# Patient Record
Sex: MALE | Race: WHITE | NOT HISPANIC OR LATINO
[De-identification: names, ages, dates, MRNs, and addresses within clinical notes are randomized per-mention and may not be internally consistent; named-entity substitution may affect disease eponyms.]

---

## 2021-12-17 ENCOUNTER — APPOINTMENT (OUTPATIENT)
Dept: UROLOGY | Facility: CLINIC | Age: 71
End: 2021-12-17
Payer: MEDICARE

## 2021-12-17 ENCOUNTER — OUTPATIENT (OUTPATIENT)
Dept: OUTPATIENT SERVICES | Facility: HOSPITAL | Age: 71
LOS: 1 days | End: 2021-12-17
Payer: MEDICARE

## 2021-12-17 ENCOUNTER — APPOINTMENT (OUTPATIENT)
Age: 71
End: 2021-12-17
Payer: MEDICARE

## 2021-12-17 VITALS
TEMPERATURE: 97.9 F | HEIGHT: 64 IN | SYSTOLIC BLOOD PRESSURE: 130 MMHG | HEART RATE: 64 BPM | RESPIRATION RATE: 17 BRPM | WEIGHT: 170 LBS | DIASTOLIC BLOOD PRESSURE: 77 MMHG | BODY MASS INDEX: 29.02 KG/M2

## 2021-12-17 DIAGNOSIS — Z00.8 ENCOUNTER FOR OTHER GENERAL EXAMINATION: ICD-10-CM

## 2021-12-17 DIAGNOSIS — R31.29 OTHER MICROSCOPIC HEMATURIA: ICD-10-CM

## 2021-12-17 PROCEDURE — 74178 CT ABD&PLV WO CNTR FLWD CNTR: CPT | Mod: ME

## 2021-12-17 PROCEDURE — G1004: CPT

## 2021-12-17 PROCEDURE — 74178 CT ABD&PLV WO CNTR FLWD CNTR: CPT | Mod: 26,ME

## 2021-12-17 PROCEDURE — 82565 ASSAY OF CREATININE: CPT

## 2021-12-17 PROCEDURE — 99203 OFFICE O/P NEW LOW 30 MIN: CPT

## 2021-12-17 NOTE — PHYSICAL EXAM
[General Appearance - Well Developed] : well developed [General Appearance - Well Nourished] : well nourished [Normal Appearance] : normal appearance [Well Groomed] : well groomed [General Appearance - In No Acute Distress] : no acute distress [Heart Rate And Rhythm] : Heart rate and rhythm were normal [Edema] : no peripheral edema [] : no respiratory distress [Exaggerated Use Of Accessory Muscles For Inspiration] : no accessory muscle use [Abdomen Soft] : soft [Abdomen Mass (___ Cm)] : no abdominal mass palpated [Prostate Tenderness] : the prostate was not tender [No Prostate Nodules] : no prostate nodules [Prostate Size ___ gm] : prostate size [unfilled] gm [Normal Station and Gait] : the gait and station were normal for the patient's age [Skin Color & Pigmentation] : normal skin color and pigmentation [No Focal Deficits] : no focal deficits [Oriented To Time, Place, And Person] : oriented to person, place, and time

## 2021-12-17 NOTE — HISTORY OF PRESENT ILLNESS
[FreeTextEntry1] : 71 year old male presents for evaluation of L renal cyst\par \par Pt notes having back pain since the summer- had MRI of the spine-  showed L renal cyst\par \par Pt denies any nocturia- gets up to pee 1x at night- denies any issues with urgency or frequency. \par \par Has occasional microscopic hematuria, has been going off and on for years\par \par No blood in the urine, dysuria.\par No urinary frequency, urgency, incomplete emptying\par \par FamHx: Mother maybe Bladder cancer, No Prostate cancer\par \par \par  [Urinary Incontinence] : no urinary incontinence [Urinary Retention] : no urinary retention [Urinary Urgency] : no urinary urgency [Urinary Frequency] : no urinary frequency [Nocturia] : no nocturia [Straining] : no straining [Dysuria] : no dysuria [Hematuria - Gross] : no gross hematuria [Hematuria - Microscopic] : microscopic hematuria [Fever] : no fever [Fatigue] : no fatigue [Nausea] : no nausea

## 2021-12-17 NOTE — ASSESSMENT
[FreeTextEntry1] : 71 year old male presents for evaluation of L renal cyst\par \par Pt notes having back pain since the summer- had MRI of the spine-  showed L renal cyst\par \par Pt denies any nocturia- gets up to pee 1x at night- denies any issues with urgency or frequency. \par \par Has occasional microscopic hematuria, has been going off and on for years\par \par MRI reviewed, large upper pole L renal cyst, appears simple and bright on T2\par \par No blood in the urine, dysuria.\par No urinary frequency, urgency, incomplete emptying\par \par FamHx: Mother maybe Bladder cancer, No Prostate cancer\par \par -- Will order CT urogram for microscopic hematuria, renal cyst seen on MRI spine\par -- Pt having low back pain, need to evaluate the etiology of the entire  system to see if cyst causing back pain

## 2024-07-02 ENCOUNTER — APPOINTMENT (OUTPATIENT)
Dept: PODIATRY | Facility: CLINIC | Age: 74
End: 2024-07-02
Payer: MEDICARE

## 2024-07-02 DIAGNOSIS — S90.121A CONTUSION OF RIGHT LESSER TOE(S) W/OUT DAMAGE TO NAIL, INITIAL ENCOUNTER: ICD-10-CM

## 2024-07-02 DIAGNOSIS — Z87.891 PERSONAL HISTORY OF NICOTINE DEPENDENCE: ICD-10-CM

## 2024-07-02 DIAGNOSIS — Z80.41 FAMILY HISTORY OF MALIGNANT NEOPLASM OF OVARY: ICD-10-CM

## 2024-07-02 DIAGNOSIS — S90.122A CONTUSION OF LEFT LESSER TOE(S) W/OUT DAMAGE TO NAIL, INITIAL ENCOUNTER: ICD-10-CM

## 2024-07-02 DIAGNOSIS — M79.676 PAIN IN UNSPECIFIED TOE(S): ICD-10-CM

## 2024-07-02 DIAGNOSIS — S90.229A CONTUSION OF UNSPECIFIED LESSER TOE(S) WITH DAMAGE TO NAIL, INITIAL ENCOUNTER: ICD-10-CM

## 2024-07-02 DIAGNOSIS — M20.10 HALLUX VALGUS (ACQUIRED), UNSPECIFIED FOOT: ICD-10-CM

## 2024-07-02 PROCEDURE — 99202 OFFICE O/P NEW SF 15 MIN: CPT

## 2024-07-02 PROCEDURE — 99212 OFFICE O/P EST SF 10 MIN: CPT

## 2024-07-02 RX ORDER — ATORVASTATIN CALCIUM 20 MG/1
20 TABLET, FILM COATED ORAL
Refills: 0 | Status: ACTIVE | COMMUNITY

## 2024-07-02 RX ORDER — LOSARTAN POTASSIUM 25 MG/1
25 TABLET, FILM COATED ORAL
Refills: 0 | Status: ACTIVE | COMMUNITY

## 2024-07-08 PROBLEM — S90.121A CONTUSION OF TOE, RIGHT: Status: ACTIVE | Noted: 2024-07-03

## 2024-07-08 PROBLEM — M79.676 TOE PAIN: Status: ACTIVE | Noted: 2024-07-03

## 2024-07-08 PROBLEM — S90.122A CONTUSION OF TOE, LEFT: Status: ACTIVE | Noted: 2024-07-03

## 2024-07-08 PROBLEM — M20.10 HALLUX VALGUS: Status: ACTIVE | Noted: 2024-07-03

## 2024-07-08 PROBLEM — S90.229A SUBUNGUAL HEMATOMA OF TOENAIL: Status: ACTIVE | Noted: 2024-07-03
